# Patient Record
Sex: FEMALE | Race: BLACK OR AFRICAN AMERICAN | NOT HISPANIC OR LATINO | URBAN - METROPOLITAN AREA
[De-identification: names, ages, dates, MRNs, and addresses within clinical notes are randomized per-mention and may not be internally consistent; named-entity substitution may affect disease eponyms.]

---

## 2024-05-21 ENCOUNTER — INPATIENT (INPATIENT)
Facility: HOSPITAL | Age: 39
LOS: 1 days | Discharge: HOME | End: 2024-05-23
Attending: STUDENT IN AN ORGANIZED HEALTH CARE EDUCATION/TRAINING PROGRAM | Admitting: STUDENT IN AN ORGANIZED HEALTH CARE EDUCATION/TRAINING PROGRAM
Payer: COMMERCIAL

## 2024-05-21 VITALS
RESPIRATION RATE: 19 BRPM | HEIGHT: 66 IN | DIASTOLIC BLOOD PRESSURE: 69 MMHG | HEART RATE: 137 BPM | OXYGEN SATURATION: 99 % | WEIGHT: 162.04 LBS | SYSTOLIC BLOOD PRESSURE: 121 MMHG | TEMPERATURE: 99 F

## 2024-05-21 DIAGNOSIS — E05.90 THYROTOXICOSIS, UNSPECIFIED WITHOUT THYROTOXIC CRISIS OR STORM: ICD-10-CM

## 2024-05-21 DIAGNOSIS — R00.0 TACHYCARDIA, UNSPECIFIED: ICD-10-CM

## 2024-05-21 LAB
ALBUMIN SERPL ELPH-MCNC: 3.4 G/DL — SIGNIFICANT CHANGE UP (ref 3.3–5)
ALP SERPL-CCNC: 71 U/L — SIGNIFICANT CHANGE UP (ref 40–120)
ALT FLD-CCNC: 61 U/L — SIGNIFICANT CHANGE UP (ref 12–78)
AMPHET UR-MCNC: NEGATIVE — SIGNIFICANT CHANGE UP
ANION GAP SERPL CALC-SCNC: 5 MMOL/L — SIGNIFICANT CHANGE UP (ref 5–17)
APPEARANCE UR: ABNORMAL
AST SERPL-CCNC: 26 U/L — SIGNIFICANT CHANGE UP (ref 15–37)
BARBITURATES UR SCN-MCNC: NEGATIVE — SIGNIFICANT CHANGE UP
BASOPHILS # BLD AUTO: 0.02 K/UL — SIGNIFICANT CHANGE UP (ref 0–0.2)
BASOPHILS NFR BLD AUTO: 0.5 % — SIGNIFICANT CHANGE UP (ref 0–2)
BENZODIAZ UR-MCNC: NEGATIVE — SIGNIFICANT CHANGE UP
BILIRUB SERPL-MCNC: 0.5 MG/DL — SIGNIFICANT CHANGE UP (ref 0.2–1.2)
BILIRUB UR-MCNC: NEGATIVE — SIGNIFICANT CHANGE UP
BUN SERPL-MCNC: 11 MG/DL — SIGNIFICANT CHANGE UP (ref 7–23)
CALCIUM SERPL-MCNC: 9.1 MG/DL — SIGNIFICANT CHANGE UP (ref 8.5–10.1)
CHLORIDE SERPL-SCNC: 111 MMOL/L — HIGH (ref 96–108)
CO2 SERPL-SCNC: 24 MMOL/L — SIGNIFICANT CHANGE UP (ref 22–31)
COCAINE METAB.OTHER UR-MCNC: NEGATIVE — SIGNIFICANT CHANGE UP
COLOR SPEC: YELLOW — SIGNIFICANT CHANGE UP
CREAT SERPL-MCNC: 0.6 MG/DL — SIGNIFICANT CHANGE UP (ref 0.5–1.3)
D DIMER BLD IA.RAPID-MCNC: 280 NG/ML DDU — HIGH
DIFF PNL FLD: ABNORMAL
EGFR: 119 ML/MIN/1.73M2 — SIGNIFICANT CHANGE UP
EOSINOPHIL # BLD AUTO: 0.07 K/UL — SIGNIFICANT CHANGE UP (ref 0–0.5)
EOSINOPHIL NFR BLD AUTO: 1.8 % — SIGNIFICANT CHANGE UP (ref 0–6)
EPI CELLS # UR: PRESENT
GLUCOSE SERPL-MCNC: 112 MG/DL — HIGH (ref 70–99)
GLUCOSE UR QL: NEGATIVE MG/DL — SIGNIFICANT CHANGE UP
HCG SERPL-ACNC: <1 MIU/ML — SIGNIFICANT CHANGE UP
HCT VFR BLD CALC: 36.2 % — SIGNIFICANT CHANGE UP (ref 34.5–45)
HGB BLD-MCNC: 11.4 G/DL — LOW (ref 11.5–15.5)
IMM GRANULOCYTES NFR BLD AUTO: 0.3 % — SIGNIFICANT CHANGE UP (ref 0–0.9)
KETONES UR-MCNC: NEGATIVE MG/DL — SIGNIFICANT CHANGE UP
LEUKOCYTE ESTERASE UR-ACNC: ABNORMAL
LYMPHOCYTES # BLD AUTO: 1.84 K/UL — SIGNIFICANT CHANGE UP (ref 1–3.3)
LYMPHOCYTES # BLD AUTO: 48 % — HIGH (ref 13–44)
MAGNESIUM SERPL-MCNC: 1.8 MG/DL — SIGNIFICANT CHANGE UP (ref 1.6–2.6)
MCHC RBC-ENTMCNC: 24.3 PG — LOW (ref 27–34)
MCHC RBC-ENTMCNC: 31.5 G/DL — LOW (ref 32–36)
MCV RBC AUTO: 77 FL — LOW (ref 80–100)
METHADONE UR-MCNC: NEGATIVE — SIGNIFICANT CHANGE UP
MONOCYTES # BLD AUTO: 0.62 K/UL — SIGNIFICANT CHANGE UP (ref 0–0.9)
MONOCYTES NFR BLD AUTO: 16.2 % — HIGH (ref 2–14)
NEUTROPHILS # BLD AUTO: 1.27 K/UL — LOW (ref 1.8–7.4)
NEUTROPHILS NFR BLD AUTO: 33.2 % — LOW (ref 43–77)
NITRITE UR-MCNC: NEGATIVE — SIGNIFICANT CHANGE UP
NRBC # BLD: 0 /100 WBCS — SIGNIFICANT CHANGE UP (ref 0–0)
OPIATES UR-MCNC: NEGATIVE — SIGNIFICANT CHANGE UP
PCP SPEC-MCNC: SIGNIFICANT CHANGE UP
PCP UR-MCNC: NEGATIVE — SIGNIFICANT CHANGE UP
PH UR: 7 — SIGNIFICANT CHANGE UP (ref 5–8)
PLATELET # BLD AUTO: 290 K/UL — SIGNIFICANT CHANGE UP (ref 150–400)
POTASSIUM SERPL-MCNC: 3.8 MMOL/L — SIGNIFICANT CHANGE UP (ref 3.5–5.3)
POTASSIUM SERPL-SCNC: 3.8 MMOL/L — SIGNIFICANT CHANGE UP (ref 3.5–5.3)
PROT SERPL-MCNC: 7 GM/DL — SIGNIFICANT CHANGE UP (ref 6–8.3)
PROT UR-MCNC: SIGNIFICANT CHANGE UP MG/DL
RBC # BLD: 4.7 M/UL — SIGNIFICANT CHANGE UP (ref 3.8–5.2)
RBC # FLD: 14.7 % — HIGH (ref 10.3–14.5)
RBC CASTS # UR COMP ASSIST: 1 /HPF — SIGNIFICANT CHANGE UP (ref 0–4)
SODIUM SERPL-SCNC: 140 MMOL/L — SIGNIFICANT CHANGE UP (ref 135–145)
SP GR SPEC: 1.02 — SIGNIFICANT CHANGE UP (ref 1–1.03)
T3 SERPL-MCNC: 355 NG/DL — HIGH (ref 80–200)
T4 AB SER-ACNC: 13.1 UG/DL — HIGH (ref 4.6–12)
T4 FREE SERPL-MCNC: 4.8 NG/DL — HIGH (ref 0.9–1.8)
THC UR QL: NEGATIVE — SIGNIFICANT CHANGE UP
TROPONIN I, HIGH SENSITIVITY RESULT: 13.2 NG/L — SIGNIFICANT CHANGE UP
TSH SERPL-MCNC: <0.005 UIU/ML — LOW (ref 0.36–3.74)
UROBILINOGEN FLD QL: 1 MG/DL — SIGNIFICANT CHANGE UP (ref 0.2–1)
WBC # BLD: 3.83 K/UL — SIGNIFICANT CHANGE UP (ref 3.8–10.5)
WBC # FLD AUTO: 3.83 K/UL — SIGNIFICANT CHANGE UP (ref 3.8–10.5)
WBC UR QL: 2 /HPF — SIGNIFICANT CHANGE UP (ref 0–5)

## 2024-05-21 PROCEDURE — 99285 EMERGENCY DEPT VISIT HI MDM: CPT

## 2024-05-21 PROCEDURE — 71046 X-RAY EXAM CHEST 2 VIEWS: CPT | Mod: 26

## 2024-05-21 PROCEDURE — 71275 CT ANGIOGRAPHY CHEST: CPT | Mod: 26,MC

## 2024-05-21 PROCEDURE — 99222 1ST HOSP IP/OBS MODERATE 55: CPT

## 2024-05-21 PROCEDURE — 93010 ELECTROCARDIOGRAM REPORT: CPT

## 2024-05-21 RX ORDER — ATENOLOL 25 MG/1
25 TABLET ORAL DAILY
Refills: 0 | Status: DISCONTINUED | OUTPATIENT
Start: 2024-05-21 | End: 2024-05-23

## 2024-05-21 RX ORDER — LANOLIN ALCOHOL/MO/W.PET/CERES
3 CREAM (GRAM) TOPICAL AT BEDTIME
Refills: 0 | Status: DISCONTINUED | OUTPATIENT
Start: 2024-05-21 | End: 2024-05-23

## 2024-05-21 RX ORDER — ACETAMINOPHEN 500 MG
650 TABLET ORAL EVERY 6 HOURS
Refills: 0 | Status: DISCONTINUED | OUTPATIENT
Start: 2024-05-21 | End: 2024-05-23

## 2024-05-21 RX ORDER — SODIUM CHLORIDE 9 MG/ML
1000 INJECTION INTRAMUSCULAR; INTRAVENOUS; SUBCUTANEOUS ONCE
Refills: 0 | Status: COMPLETED | OUTPATIENT
Start: 2024-05-21 | End: 2024-05-21

## 2024-05-21 RX ADMIN — SODIUM CHLORIDE 1000 MILLILITER(S): 9 INJECTION INTRAMUSCULAR; INTRAVENOUS; SUBCUTANEOUS at 10:25

## 2024-05-21 RX ADMIN — ATENOLOL 25 MILLIGRAM(S): 25 TABLET ORAL at 11:55

## 2024-05-21 RX ADMIN — SODIUM CHLORIDE 1000 MILLILITER(S): 9 INJECTION INTRAMUSCULAR; INTRAVENOUS; SUBCUTANEOUS at 11:55

## 2024-05-21 NOTE — ED ADULT NURSE NOTE - NSFALLUNIVINTERV_ED_ALL_ED
Bed/Stretcher in lowest position, wheels locked, appropriate side rails in place/Call bell, personal items and telephone in reach/Instruct patient to call for assistance before getting out of bed/chair/stretcher/Non-slip footwear applied when patient is off stretcher/[place] to call system/Physically safe environment - no spills, clutter or unnecessary equipment/Purposeful proactive rounding/Room/bathroom lighting operational, light cord in reach

## 2024-05-21 NOTE — ED PROVIDER NOTE - CCCP TRG CHIEF CMPLNT
Where Is Your Acne Located?: Face jawline and chin area List Over The Counter Products You Tried (Separate Each Name With A Comma):: [place] posay gel palpitations

## 2024-05-21 NOTE — H&P ADULT - HISTORY OF PRESENT ILLNESS
38 years old female with no significant past medical history present to present to ED with complain palpitation. Patient reported intermittent palpitation for last 7 days, which becomes persistent for 1 day, associated with heat intolerance. No nausea, vomiting, diarrhe. No h/o thyroid problem. No recent viral illness. No chest pain or SOB. No recent travel history.  Tachycardic to 137 upon ED arrival. EKG with sinus tachy. No leukocytosis, plt 290, ddimer 280, Cr 0.6, TSH < 0.005. CTA chest with no PE. Multiple nonspecific hypodense nodules in the thyroid bilaterally with the largest measuring up to 0.9 cm on the left    SH: no toxic habits  FH; HTN

## 2024-05-21 NOTE — ED ADULT NURSE REASSESSMENT NOTE - NS ED NURSE REASSESS COMMENT FT1
Pt sent to XR and upon return explained Ddimer results. CT now ordered. Dr. Torres also discussed with patient plan of care. Awaiting CT at this time. Mom at bedside

## 2024-05-21 NOTE — ED ADULT TRIAGE NOTE - CHIEF COMPLAINT QUOTE
Patient presents to ED c/o heart racing x 1 week. Denies any chest pain, lightheadedness or dizziness. Heart rate in triage 137.   no pmh

## 2024-05-21 NOTE — ED PROVIDER NOTE - CLINICAL SUMMARY MEDICAL DECISION MAKING FREE TEXT BOX
39yo female with no pmh presenting with palpitations.  Started last week and were intermittent without identifiable trigger.  More recently has been more constant heart racing sensation that kept her up throughout last night.  Had slight chest discomfort earlier and occasional sob.  Currently feels well.  Denies fever, cough, abd pain, n/v/d, poor appetite, urinary symptoms, pregnancy, edema, recent illnesses.  No hx dvt/ pe, no ocp use.  Social alcohol use, nonsmoker, no other drugs, rare caffeine.  ECG sinus tachycardia.  Patient well appearing in nad.  Will eval for anemia, electrolytes, dehydration.  Dimer r/o pe.  Lower concern acs.  Plan labs, xr, tele, reassess.

## 2024-05-21 NOTE — H&P ADULT - NSHPPHYSICALEXAM_GEN_ALL_CORE
CONSTITUTIONAL: alert and cooperative, no acute distress  EYES: PERRL, no scleral icterus  ENT: Mucosa moist, tongue normal  NECK: Neck supple, trachea midline, non-tender. Enlarged thyroid glands  CARDIAC: Normal S1 and S2. Regular rate and rhythms.   LUNGS: Clear to auscultation, equal air entry both lungs. No rales, rhonchi, wheezing. Normal respiratory effort.   ABDOMEN: Soft, nondistended, nontender. No guarding or rebound tenderness. No hepatomegaly or splenomegaly. Bowel sound normal.  MUSCULOSKELETAL: Normocephalic, atraumatic. No significant deformity or joint abnormality  NEUROLOGICAL: No gross motor or sensory deficits.  SKIN: no lesions or eruptions. Normal turgor  PSYCHIATRIC: A&O x 3, appropriate mood and affect

## 2024-05-21 NOTE — ED PROVIDER NOTE - PHYSICAL EXAMINATION
General appearance: Nontoxic appearing, conversant, afebrile    Eyes: anicteric sclerae, KANDACE, EOMI   HENT: Atraumatic; oropharynx clear, MMM and no ulcerations, no pharyngeal erythema or exudate   Neck: Trachea midline; Full range of motion, supple   Pulm: CTA bl, normal respiratory effort and no intercostal retractions, normal work of breathing   CV: Tachycardic, regular, No murmurs, rubs, or gallops   Abdomen: Soft, non-tender, non-distended; no guarding or rebound   Extremities: No peripheral edema, no gross deformities, FROM x4   Skin: Dry, normal temperature, turgor and texture; no rash   Psych: Appropriate affect, cooperative

## 2024-05-21 NOTE — H&P ADULT - PROBLEM SELECTOR PLAN 2
Tachycardic to 137 upon ED arrival, EKG  ( I personally review) with sinus tachy,    CTA chest  ( I personally review) with no PE.  due to hyperthyroidism  start atenolol 25mg daily, titrate BB with HR and BP, telemetry monitoring  Check Utox

## 2024-05-21 NOTE — ED ADULT NURSE NOTE - OBJECTIVE STATEMENT
no Patient presents to ED c/o heart racing x 1 week. Denies any chest pain, lightheadedness or dizziness. Heart rate in triage 137.   no pmh

## 2024-05-21 NOTE — ED PROVIDER NOTE - CARE PLAN
Principal Discharge DX:	Palpitations   1 Principal Discharge DX:	Palpitations  Secondary Diagnosis:	Hyperthyroidism

## 2024-05-21 NOTE — H&P ADULT - PROBLEM SELECTOR PLAN 1
present with palpitation, heat intolerance  TSH < 0.005 ( patient had normal TSH 0.62 in 01/29/2024, from her phone vito)  thyromegaly in exam  likely thyroiditis, need to R/O graves  check thyroid ultrasound  Check free thyroxine, T3 total, TSI, TPO Ab, anti thyroglobulin Ab  start atenolol 25mg, titrate BB with HR and BP  Endo consulted---> Dr Arce present with palpitation, heat intolerance  TSH < 0.005 ( patient had normal TSH 0.62 in 01/29/2024, from her phone vito)  thyromegaly in exam  likely thyroiditis, need to R/O graves  check thyroid ultrasound  Check free thyroxine, T3 total, TSI, TPO Ab, anti thyroglobulin Ab  start atenolol 25mg, titrate BB with HR and BP  Endo consulted---> Dr Arce    Addendum:  Free thyroxine 4.8 which is more than 2 x upper normal limit. Will give methimazole 10mg once stat dose. Pending Endo evaluation for treatment recommendation

## 2024-05-21 NOTE — ED ADULT TRIAGE NOTE - BMI (KG/M2)
26.2 Closure 2 Information: This tab is for additional flaps and grafts, including complex repair and grafts and complex repair and flaps. You can also specify a different location for the additional defect, if the location is the same you do not need to select a new one. We will insert the automated text for the repair you select below just as we do for solitary flaps and grafts. Please note that at this time if you select a location with a different insurance zone you will need to override the ICD10 and CPT if appropriate.

## 2024-05-21 NOTE — H&P ADULT - ASSESSMENT
38 years old female with no significant past medical history present to present to ED with complain palpitation. Patient reported intermittent palpitation for last 7 days, which becomes persistent for 1 day, associated with heat intolerance. No nausea, vomiting, diarrhe. No h/o thyroid problem. No recent viral illness. No chest pain or SOB. No recent travel history.  Tachycardic to 137 upon ED arrival. EKG with sinus tachy. No leukocytosis, plt 290, ddimer 280, Cr 0.6, TSH < 0.005. CTA chest with no PE. Multiple nonspecific hypodense nodules in the thyroid bilaterally with the largest measuring up to 0.9 cm on the left

## 2024-05-22 LAB
A1C WITH ESTIMATED AVERAGE GLUCOSE RESULT: 5.1 % — SIGNIFICANT CHANGE UP (ref 4–5.6)
ALBUMIN SERPL ELPH-MCNC: 3.2 G/DL — LOW (ref 3.3–5)
ALP SERPL-CCNC: 62 U/L — SIGNIFICANT CHANGE UP (ref 40–120)
ALT FLD-CCNC: 56 U/L — SIGNIFICANT CHANGE UP (ref 12–78)
ANION GAP SERPL CALC-SCNC: 7 MMOL/L — SIGNIFICANT CHANGE UP (ref 5–17)
AST SERPL-CCNC: 20 U/L — SIGNIFICANT CHANGE UP (ref 15–37)
BILIRUB SERPL-MCNC: 0.3 MG/DL — SIGNIFICANT CHANGE UP (ref 0.2–1.2)
BUN SERPL-MCNC: 13 MG/DL — SIGNIFICANT CHANGE UP (ref 7–23)
CALCIUM SERPL-MCNC: 8.8 MG/DL — SIGNIFICANT CHANGE UP (ref 8.5–10.1)
CHLORIDE SERPL-SCNC: 111 MMOL/L — HIGH (ref 96–108)
CHOLEST SERPL-MCNC: 98 MG/DL — SIGNIFICANT CHANGE UP
CO2 SERPL-SCNC: 23 MMOL/L — SIGNIFICANT CHANGE UP (ref 22–31)
CREAT SERPL-MCNC: 0.48 MG/DL — LOW (ref 0.5–1.3)
EGFR: 126 ML/MIN/1.73M2 — SIGNIFICANT CHANGE UP
ESTIMATED AVERAGE GLUCOSE: 100 MG/DL — SIGNIFICANT CHANGE UP (ref 68–114)
GLUCOSE SERPL-MCNC: 92 MG/DL — SIGNIFICANT CHANGE UP (ref 70–99)
HCT VFR BLD CALC: 36.7 % — SIGNIFICANT CHANGE UP (ref 34.5–45)
HDLC SERPL-MCNC: 35 MG/DL — LOW
HGB BLD-MCNC: 11.6 G/DL — SIGNIFICANT CHANGE UP (ref 11.5–15.5)
LIPID PNL WITH DIRECT LDL SERPL: 50 MG/DL — SIGNIFICANT CHANGE UP
MAGNESIUM SERPL-MCNC: 1.9 MG/DL — SIGNIFICANT CHANGE UP (ref 1.6–2.6)
MCHC RBC-ENTMCNC: 24.5 PG — LOW (ref 27–34)
MCHC RBC-ENTMCNC: 31.6 G/DL — LOW (ref 32–36)
MCV RBC AUTO: 77.6 FL — LOW (ref 80–100)
NON HDL CHOLESTEROL: 64 MG/DL — SIGNIFICANT CHANGE UP
NRBC # BLD: 0 /100 WBCS — SIGNIFICANT CHANGE UP (ref 0–0)
PHOSPHATE SERPL-MCNC: 4 MG/DL — SIGNIFICANT CHANGE UP (ref 2.5–4.5)
PLATELET # BLD AUTO: 261 K/UL — SIGNIFICANT CHANGE UP (ref 150–400)
POTASSIUM SERPL-MCNC: 4 MMOL/L — SIGNIFICANT CHANGE UP (ref 3.5–5.3)
POTASSIUM SERPL-SCNC: 4 MMOL/L — SIGNIFICANT CHANGE UP (ref 3.5–5.3)
PROT SERPL-MCNC: 6.7 GM/DL — SIGNIFICANT CHANGE UP (ref 6–8.3)
RBC # BLD: 4.73 M/UL — SIGNIFICANT CHANGE UP (ref 3.8–5.2)
RBC # FLD: 14.8 % — HIGH (ref 10.3–14.5)
SODIUM SERPL-SCNC: 141 MMOL/L — SIGNIFICANT CHANGE UP (ref 135–145)
THYROPEROXIDASE AB SERPL-ACNC: 689 IU/ML — HIGH
TRIGL SERPL-MCNC: 61 MG/DL — SIGNIFICANT CHANGE UP
WBC # BLD: 4.02 K/UL — SIGNIFICANT CHANGE UP (ref 3.8–10.5)
WBC # FLD AUTO: 4.02 K/UL — SIGNIFICANT CHANGE UP (ref 3.8–10.5)

## 2024-05-22 PROCEDURE — 99232 SBSQ HOSP IP/OBS MODERATE 35: CPT

## 2024-05-22 RX ORDER — PROPYLTHIOURACIL 50 MG
50 TABLET ORAL THREE TIMES A DAY
Refills: 0 | Status: DISCONTINUED | OUTPATIENT
Start: 2024-05-22 | End: 2024-05-23

## 2024-05-22 RX ORDER — HEPARIN SODIUM 5000 [USP'U]/ML
5000 INJECTION INTRAVENOUS; SUBCUTANEOUS EVERY 8 HOURS
Refills: 0 | Status: DISCONTINUED | OUTPATIENT
Start: 2024-05-22 | End: 2024-05-23

## 2024-05-22 RX ADMIN — HEPARIN SODIUM 5000 UNIT(S): 5000 INJECTION INTRAVENOUS; SUBCUTANEOUS at 21:59

## 2024-05-22 RX ADMIN — ATENOLOL 25 MILLIGRAM(S): 25 TABLET ORAL at 07:19

## 2024-05-22 RX ADMIN — Medication 50 MILLIGRAM(S): at 23:10

## 2024-05-22 NOTE — PROGRESS NOTE ADULT - PROBLEM SELECTOR PLAN 1
present with palpitation, heat intolerance  TSH < 0.005 ( patient had normal TSH 0.62 in 01/29/2024, from her phone vito)  thyromegaly in exam  likely thyroiditis, need to R/O graves  check thyroid ultrasound  Check free thyroxine, T3 total, TSI, TPO Ab, anti thyroglobulin Ab  start atenolol 25mg, titrate BB with HR and BP  - F/u endo recs

## 2024-05-22 NOTE — CONSULT NOTE ADULT - PROBLEM SELECTOR RECOMMENDATION 9
Start on PTU 50 mg every 8 hours.  Watch for any neutropenia with sore throat, any jaundice or any truncal rash.  That would necessitate stopping the medication.  Methimazole should be avoided in the pregnancy age group.  In case thyroid medications fail or she has a new symptomatic or otherwise allergic reaction to the medications, consider surgery rather than radioiodine  Thank you for the courtesy of this consultation

## 2024-05-22 NOTE — PROGRESS NOTE ADULT - ASSESSMENT
38 years old female with no significant past medical history present to present to ED with complain palpitation. Patient reported intermittent palpitation for last 7 days, which becomes persistent for 1 day, associated with heat intolerance. No nausea, vomiting, diarrhe. No h/o thyroid problem. No recent viral illness. No chest pain or SOB. No recent travel history.Tachycardic to 137 upon ED arrival. EKG with sinus tachy. No leukocytosis, plt 290, ddimer 280, Cr 0.6, TSH < 0.005. CTA chest with no PE. Multiple nonspecific hypodense nodules in the thyroid bilaterally with the largest measuring up to 0.9 cm on the left

## 2024-05-22 NOTE — CONSULT NOTE ADULT - SUBJECTIVE AND OBJECTIVE BOX
Patient is a 38y old  Female who presents with a chief complaint of hyperthyroidism (22 May 2024 18:54)      Reason For Consult: Hyperthyroidism     HPI:  38 years old female with no significant past medical history present to present to ED with complain palpitation. Patient reported intermittent palpitation for last 7 days, which becomes persistent for 1 day, associated with heat intolerance. No nausea, vomiting, diarrhe. No h/o thyroid problem. No recent viral illness. No chest pain or SOB. No recent travel history.  Tachycardic to 137 upon ED arrival. EKG with sinus tachy. No leukocytosis, plt 290, ddimer 280, Cr 0.6, TSH < 0.005. CTA chest with no PE. Multiple nonspecific hypodense nodules in the thyroid bilaterally with the largest measuring up to 0.9 cm on the left  She has no prior history of thyroid problems but and thyroid function test points towards aggressive hyperthyroidism management of the symptomatology consistent with.  Sudden development of the symptoms described.  Makes it necessary for thyroiditis to be ruled out also  SH: no toxic habits  FH; HTN (21 May 2024 12:12)      PAST MEDICAL & SURGICAL HISTORY:      FAMILY HISTORY:        Social History:    MEDICATIONS  (STANDING):  atenolol  Tablet 25 milliGRAM(s) Oral daily  heparin   Injectable 5000 Unit(s) SubCutaneous every 8 hours  propylthiouracil 50 milliGRAM(s) Oral three times a day    MEDICATIONS  (PRN):  acetaminophen     Tablet .. 650 milliGRAM(s) Oral every 6 hours PRN Mild Pain (1 - 3), Moderate Pain (4 - 6)  melatonin 3 milliGRAM(s) Oral at bedtime PRN Insomnia      REVIEW OF SYSTEMS:  CONSTITUTIONAL:  as per HPI  HEENT:  Eyes:  No diplopia or blurred vision.   ENT:  No earache, sore throat or runny nose.  CARDIOVASCULAR:  No chest pain .  RESPIRATORY:  No cough, shortness of breath, PND or orthopnea.  GASTROINTESTINAL:  No nausea, vomiting or diarrhea.  GENITOURINARY:  No dysuria, frequency or urgency. No Blood in urine  MUSCULOSKELETAL:  no joint aches, no muscle pain, myalgia  SKIN:  No change in skin, hair or nails.  NEUROLOGIC:  No paresthesias, fasciculations, seizures or weakness.  PSYCHIATRIC:  No disorder of thought or mood.  ENDOCRINE:   heat  intolerance, Weight loss increased sweating  HEMATOLOGICAL:  No easy bruising or bleeding.     T(C): 37.2 (05-22-24 @ 20:50), Max: 37.2 (05-22-24 @ 20:50)  HR: 96 (05-22-24 @ 20:50) (96 - 105)  BP: 109/72 (05-22-24 @ 20:50) (97/66 - 109/72)  RR: 18 (05-22-24 @ 20:50) (18 - 20)  SpO2: 99% (05-22-24 @ 20:50) (97% - 100%)  Wt(kg): --    PHYSICAL EXAM: anxious   GENERAL: NAD, well-groomed, well-developed  HEAD:  Atraumatic, Normocephalic  EYES: PERRLA, conjunctiva and sclera clear  ENMT: No  exudates,, Moist mucous membranes,, No lesions  NECK: Supple, No JVD,   NERVOUS SYSTEM:  Alert & Oriented   CHEST/LUNG: Clear to percussion bilaterally; No rales, rhonchi, wheezing, or rubs  HEART: Regular rate and rhythm; No murmurs, rubs, or gallops  ABDOMEN: Soft, Nontender, Nondistended; Bowel sounds present  EXTREMITIES:  2+ Peripheral Pulses, No clubbing, cyanosis, or edema  LYMPH: No lymphadenopathy noted  SKIN: No rashes or lesions    CAPILLARY BLOOD GLUCOSE                                11.6   4.02  )-----------( 261      ( 22 May 2024 05:55 )             36.7       CMP:  05-22 @ 05:55  SGPT 56  Albumin 3.2   Alk Phos 62   Anion Gap 7   SGOT 20   Total Bili 0.3   BUN 13   Calcium Total 8.8   CO2 23   Chloride 111   Creatinine 0.48   eGFR if AA --   eGFR if non AA --   Glucose 92   Potassium 4.0   Protein 6.7   Sodium 141      Thyroid Function Tests:  05-21 @ 11:31 TSH -- FreeT4 4.8 T3 355 Anti .0 Anti Thyroglobulin Ab -- TSI --  05-21 @ 09:30 TSH <0.005 FreeT4 -- T3 -- Anti TPO -- Anti Thyroglobulin Ab -- TSI --      Diabetes Tests:     Parathyroids:     Adrenals:       Radiology:

## 2024-05-22 NOTE — PROGRESS NOTE ADULT - SUBJECTIVE AND OBJECTIVE BOX
Patient is a 38y old  Female who presents with a chief complaint of hyperthyroidism (21 May 2024 12:12)    INTERVAL HPI/OVERNIGHT EVENTS: No acute events overnight. HD stable.     MEDICATIONS  (STANDING):  atenolol  Tablet 25 milliGRAM(s) Oral daily    MEDICATIONS  (PRN):  acetaminophen     Tablet .. 650 milliGRAM(s) Oral every 6 hours PRN Mild Pain (1 - 3), Moderate Pain (4 - 6)  melatonin 3 milliGRAM(s) Oral at bedtime PRN Insomnia      Allergies    No Known Allergies    Intolerances        REVIEW OF SYSTEMS: all negative with exception of above    Vital Signs Last 24 Hrs  T(C): 36.9 (22 May 2024 15:18), Max: 37.1 (22 May 2024 00:05)  T(F): 98.5 (22 May 2024 15:18), Max: 98.7 (22 May 2024 00:05)  HR: 97 (22 May 2024 15:18) (96 - 105)  BP: 97/66 (22 May 2024 15:18) (97/66 - 106/71)  BP(mean): --  RR: 20 (22 May 2024 15:18) (18 - 20)  SpO2: 100% (22 May 2024 15:18) (97% - 100%)    Parameters below as of 22 May 2024 15:18  Patient On (Oxygen Delivery Method): room air    PHYSICAL EXAM:  GENERAL: NAD, well-groomed  NERVOUS SYSTEM:  Alert & Oriented X3, Good concentration; Motor Strength 5/5 B/L upper and lower extremities; DTRs 2+ intact and symmetric  CHEST/LUNG: Clear to percussion bilaterally; No rales, rhonchi, wheezing, or rubs  HEART: Regular rate and rhythm; No murmurs, rubs, or gallops  ABDOMEN: Soft, Nontender, Nondistended; Bowel sounds present  EXTREMITIES:  2+ Peripheral Pulses, No clubbing, cyanosis, or edema    LABS:                        11.6   4.02  )-----------( 261      ( 22 May 2024 05:55 )             36.7     05-22    141  |  111<H>  |  13  ----------------------------<  92  4.0   |  23  |  0.48<L>    Ca    8.8      22 May 2024 05:55  Phos  4.0     05-22  Mg     1.9     05-22    TPro  6.7  /  Alb  3.2<L>  /  TBili  0.3  /  DBili  x   /  AST  20  /  ALT  56  /  AlkPhos  62  05-22      Urinalysis Basic - ( 22 May 2024 05:55 )    Color: x / Appearance: x / SG: x / pH: x  Gluc: 92 mg/dL / Ketone: x  / Bili: x / Urobili: x   Blood: x / Protein: x / Nitrite: x   Leuk Esterase: x / RBC: x / WBC x   Sq Epi: x / Non Sq Epi: x / Bacteria: x      CAPILLARY BLOOD GLUCOSE          RADIOLOGY & ADDITIONAL TESTS:    Imaging Personally Reviewed:  [ ] YES  [ ] NO    Consultant(s) Notes Reviewed:  [ ] YES  [ ] NO    Care Discussed with Consultants/Other Providers [ ] YES  [ ] NO

## 2024-05-23 VITALS
RESPIRATION RATE: 17 BRPM | OXYGEN SATURATION: 98 % | HEART RATE: 110 BPM | DIASTOLIC BLOOD PRESSURE: 73 MMHG | SYSTOLIC BLOOD PRESSURE: 109 MMHG | TEMPERATURE: 99 F

## 2024-05-23 LAB
ALBUMIN SERPL ELPH-MCNC: 3.6 G/DL — SIGNIFICANT CHANGE UP (ref 3.3–5)
ALP SERPL-CCNC: 68 U/L — SIGNIFICANT CHANGE UP (ref 40–120)
ALT FLD-CCNC: 65 U/L — SIGNIFICANT CHANGE UP (ref 12–78)
ANION GAP SERPL CALC-SCNC: 8 MMOL/L — SIGNIFICANT CHANGE UP (ref 5–17)
AST SERPL-CCNC: 33 U/L — SIGNIFICANT CHANGE UP (ref 15–37)
BILIRUB SERPL-MCNC: 0.7 MG/DL — SIGNIFICANT CHANGE UP (ref 0.2–1.2)
BUN SERPL-MCNC: 17 MG/DL — SIGNIFICANT CHANGE UP (ref 7–23)
CALCIUM SERPL-MCNC: 9.5 MG/DL — SIGNIFICANT CHANGE UP (ref 8.5–10.1)
CHLORIDE SERPL-SCNC: 110 MMOL/L — HIGH (ref 96–108)
CO2 SERPL-SCNC: 21 MMOL/L — LOW (ref 22–31)
CREAT SERPL-MCNC: 0.57 MG/DL — SIGNIFICANT CHANGE UP (ref 0.5–1.3)
EGFR: 121 ML/MIN/1.73M2 — SIGNIFICANT CHANGE UP
GLUCOSE SERPL-MCNC: 99 MG/DL — SIGNIFICANT CHANGE UP (ref 70–99)
HCT VFR BLD CALC: 40 % — SIGNIFICANT CHANGE UP (ref 34.5–45)
HGB BLD-MCNC: 12.7 G/DL — SIGNIFICANT CHANGE UP (ref 11.5–15.5)
MCHC RBC-ENTMCNC: 24.6 PG — LOW (ref 27–34)
MCHC RBC-ENTMCNC: 31.8 G/DL — LOW (ref 32–36)
MCV RBC AUTO: 77.4 FL — LOW (ref 80–100)
NRBC # BLD: 0 /100 WBCS — SIGNIFICANT CHANGE UP (ref 0–0)
PLATELET # BLD AUTO: 313 K/UL — SIGNIFICANT CHANGE UP (ref 150–400)
POTASSIUM SERPL-MCNC: 3.9 MMOL/L — SIGNIFICANT CHANGE UP (ref 3.5–5.3)
POTASSIUM SERPL-SCNC: 3.9 MMOL/L — SIGNIFICANT CHANGE UP (ref 3.5–5.3)
PROT SERPL-MCNC: 7.5 GM/DL — SIGNIFICANT CHANGE UP (ref 6–8.3)
RBC # BLD: 5.17 M/UL — SIGNIFICANT CHANGE UP (ref 3.8–5.2)
RBC # FLD: 14.6 % — HIGH (ref 10.3–14.5)
SODIUM SERPL-SCNC: 139 MMOL/L — SIGNIFICANT CHANGE UP (ref 135–145)
T3 SERPL-MCNC: 276 NG/DL — HIGH (ref 80–200)
T4 AB SER-ACNC: 13.5 UG/DL — HIGH (ref 4.6–12)
TSH SERPL-MCNC: <0.005 UIU/ML — LOW (ref 0.36–3.74)
TSI ACT/NOR SER: 2.66 IU/L — HIGH (ref 0–0.55)
WBC # BLD: 4.81 K/UL — SIGNIFICANT CHANGE UP (ref 3.8–10.5)
WBC # FLD AUTO: 4.81 K/UL — SIGNIFICANT CHANGE UP (ref 3.8–10.5)

## 2024-05-23 PROCEDURE — 99239 HOSP IP/OBS DSCHRG MGMT >30: CPT

## 2024-05-23 RX ORDER — ATENOLOL 25 MG/1
1 TABLET ORAL
Qty: 30 | Refills: 0
Start: 2024-05-23 | End: 2024-06-21

## 2024-05-23 RX ORDER — PROPYLTHIOURACIL 50 MG
1 TABLET ORAL
Qty: 90 | Refills: 0
Start: 2024-05-23 | End: 2024-06-21

## 2024-05-23 RX ADMIN — HEPARIN SODIUM 5000 UNIT(S): 5000 INJECTION INTRAVENOUS; SUBCUTANEOUS at 05:46

## 2024-05-23 RX ADMIN — Medication 50 MILLIGRAM(S): at 05:45

## 2024-05-23 RX ADMIN — Medication 50 MILLIGRAM(S): at 13:06

## 2024-05-23 NOTE — DISCHARGE NOTE PROVIDER - NSDCCPCAREPLAN_GEN_ALL_CORE_FT
PRINCIPAL DISCHARGE DIAGNOSIS  Diagnosis: Hyperthyroidism  Assessment and Plan of Treatment: Propithiouracil 50mg tablet every 8 hours. Follow up with  Endocirnologist      SECONDARY DISCHARGE DIAGNOSES  Diagnosis: Sinus tachycardia  Assessment and Plan of Treatment: Atenolol 25mg tablet once a day    Diagnosis: Hyperthyroidism  Assessment and Plan of Treatment:      PRINCIPAL DISCHARGE DIAGNOSIS  Diagnosis: Acute thyrotoxicosis  Assessment and Plan of Treatment: Propithiouracil 50mg tablet every 8 hours. Follow up with Dr.Sahay Coleslogrk      SECONDARY DISCHARGE DIAGNOSES  Diagnosis: Sinus tachycardia  Assessment and Plan of Treatment: Atenolol 25mg tablet once a day    Diagnosis: Acute thyrotoxicosis  Assessment and Plan of Treatment: Propithiouracil 50mg tablet every 8 hours. Follow up with Dr.Sahay Rico

## 2024-05-23 NOTE — DISCHARGE NOTE PROVIDER - NSDCMRMEDTOKEN_GEN_ALL_CORE_FT
atenolol 25 mg oral tablet: 1 tab(s) orally once a day  propylthiouracil 50 mg oral tablet: 1 tab(s) orally 3 times a day

## 2024-05-23 NOTE — DISCHARGE NOTE NURSING/CASE MANAGEMENT/SOCIAL WORK - NSDCPEFALRISK_GEN_ALL_CORE
For information on Fall & Injury Prevention, visit: [email or web address] OR  [email or web address] OR  https://www.cdc.gov/steadi/patient.html

## 2024-05-23 NOTE — DISCHARGE NOTE PROVIDER - ATTENDING DISCHARGE PHYSICAL EXAMINATION:
Vital Signs Last 24 Hrs  T(F): 98.9 (23 May 2024 13:05), Max: 98.9 (22 May 2024 20:50)  HR: 110 (23 May 2024 13:05) (86 - 111)  BP: 109/73 (23 May 2024 13:05) (97/66 - 109/73)  RR: 17 (23 May 2024 13:05) (17 - 20)  SpO2: 98% (23 May 2024 13:05) (95% - 100%)    Physical Exam:  Constitutional: NAD, awake and alert  Respiratory: cta b/l no wheezing no rhonchi  Cardiovascular: +s1/s2 no edema b/l le  Gastrointestinal: soft nt nd bs+  Vascular: 2+ peripheral pulses  Neurological: A/O x 3, no focal deficits

## 2024-05-23 NOTE — DISCHARGE NOTE PROVIDER - CARE PROVIDER_API CALL
Nixon Arce  Endocrinology/Metab/Diabetes  [address], [address]  [place] [address]  Phone: [phone]  Fax: [phone]  Follow Up Time: 2 weeks

## 2024-05-23 NOTE — DISCHARGE NOTE PROVIDER - HOSPITAL COURSE
38 years old female with no significant past medical history present to present to ED with complain palpitation. Patient reported intermittent palpitation for last 7 days, which becomes persistent for 1 day, associated with heat intolerance. No nausea, vomiting, diarrhe. No h/o thyroid problem. No recent viral illness. No chest pain or SOB. No recent travel history.Tachycardic to 137 upon ED arrival. EKG with sinus tachy. No leukocytosis, plt 290, ddimer 280, Cr 0.6, TSH < 0.005. CTA chest with no PE. Multiple nonspecific hypodense nodules in the thyroid bilaterally with the largest measuring up to 0.9 cm on the left       Problem/Plan - 1:  ·  Problem: Hyperthyroidism.   ·  Plan: present with palpitation, heat intolerance  TSH < 0.005 ( patient had normal TSH 0.62 in 01/29/2024, from her phone vito)  thyromegaly in exam  likely thyroiditis, need to R/O graves  check thyroid ultrasound  free thyroxine, T3 total, TSI, TPO Ab 689, anti thyroglobulin Ab  start atenolol 25mg, titrate BB with HR and BP  - Endocrinology: Start on PTU 50 mg every 8 hours.  Watch for any neutropenia with sore throat, any jaundice or any truncal rash.  That would necessitate stopping the medication.  Methimazole should be avoided in the pregnancy age group.  In case thyroid medications fail or she has a new symptomatic or otherwise allergic reaction to the medications, consider surgery rather than radioiodine       Problem/Plan - 2:  ·  Problem: Sinus tachycardia.   ·  Plan: Tachycardic to 137 upon ED arrival, EKG  ( I personally review) with sinus tachy,    CTA chest  ( I personally review) with no PE.  due to hyperthyroidism  start atenolol 25mg daily, titrate BB with HR and BP, telemetry monitoring  Check Urine toxicology came back negative.

## 2024-05-23 NOTE — DISCHARGE NOTE NURSING/CASE MANAGEMENT/SOCIAL WORK - PATIENT PORTAL LINK FT
You can access the FollowMyHealth Patient Portal offered by [facility] by registering at the following website: [email or web address] By joining [employer]’s FollowMyHealth portal, you will also be able to view your health information using other applications (apps) compatible with our system.

## 2024-05-29 DIAGNOSIS — E05.90 THYROTOXICOSIS, UNSPECIFIED WITHOUT THYROTOXIC CRISIS OR STORM: ICD-10-CM

## 2024-05-29 DIAGNOSIS — Z82.49 FAMILY HISTORY OF ISCHEMIC HEART DISEASE AND OTHER DISEASES OF THE CIRCULATORY SYSTEM: ICD-10-CM

## 2024-05-29 DIAGNOSIS — R00.0 TACHYCARDIA, UNSPECIFIED: ICD-10-CM

## 2024-06-04 LAB
THYROGLOB SERPL-MCNC: 2 IU/ML — HIGH
THYROGLOB SERPL-MCNC: 240 NG/ML — HIGH
THYROGLOB SERPL-MCNC: SIGNIFICANT CHANGE UP NG/ML